# Patient Record
Sex: MALE | Race: BLACK OR AFRICAN AMERICAN | Employment: OTHER | URBAN - METROPOLITAN AREA
[De-identification: names, ages, dates, MRNs, and addresses within clinical notes are randomized per-mention and may not be internally consistent; named-entity substitution may affect disease eponyms.]

---

## 2021-09-09 DIAGNOSIS — J93.83 OTHER PNEUMOTHORAX: Primary | ICD-10-CM

## 2021-09-14 ENCOUNTER — TELEPHONE (OUTPATIENT)
Dept: CARDIOTHORACIC SURGERY | Age: 76
End: 2021-09-14

## 2021-09-14 NOTE — TELEPHONE ENCOUNTER
Unable to LVM to reschedule pts appointment for Thursday. Pts phone was continuously ringing. Pt also does not have any emergency contacts.

## 2021-09-21 ENCOUNTER — TELEPHONE (OUTPATIENT)
Dept: CARDIOTHORACIC SURGERY | Age: 76
End: 2021-09-21

## 2021-09-21 NOTE — TELEPHONE ENCOUNTER
Dr. Josefina Guzman calls from [facility] stating that pts upcoming appointment this Thursday needs to be rescheduled out 2 weeks d/t having an infection at drain site and being admitted. He states if there are any questions or concerns to have Dr. Sherine Felton call his cell phone.  [phone]

## 2021-10-06 ENCOUNTER — OFFICE VISIT (OUTPATIENT)
Dept: CARDIOTHORACIC SURGERY | Age: 76
End: 2021-10-06
Payer: COMMERCIAL

## 2021-10-06 VITALS
WEIGHT: 207 LBS | HEART RATE: 109 BPM | TEMPERATURE: 98.3 F | HEIGHT: 72 IN | OXYGEN SATURATION: 96 % | DIASTOLIC BLOOD PRESSURE: 84 MMHG | SYSTOLIC BLOOD PRESSURE: 136 MMHG | BODY MASS INDEX: 28.04 KG/M2 | RESPIRATION RATE: 17 BRPM

## 2021-10-06 DIAGNOSIS — J93.83 OTHER PNEUMOTHORAX: Primary | ICD-10-CM

## 2021-10-06 PROCEDURE — 99203 OFFICE O/P NEW LOW 30 MIN: CPT | Performed by: NURSE PRACTITIONER

## 2021-10-06 RX ORDER — MIRTAZAPINE 15 MG/1
7.5 TABLET, FILM COATED ORAL NIGHTLY
COMMUNITY
Start: 2021-07-06

## 2021-10-06 RX ORDER — LANOLIN ALCOHOL/MO/W.PET/CERES
2 CREAM (GRAM) TOPICAL 2 TIMES DAILY
COMMUNITY

## 2021-10-06 RX ORDER — DOXYCYCLINE HYCLATE 100 MG/1
CAPSULE ORAL
COMMUNITY
Start: 2021-09-21

## 2021-10-06 RX ORDER — LISINOPRIL AND HYDROCHLOROTHIAZIDE 25; 20 MG/1; MG/1
TABLET ORAL
COMMUNITY
Start: 2021-07-12

## 2021-10-06 RX ORDER — ALBUTEROL SULFATE 90 UG/1
2 AEROSOL, METERED RESPIRATORY (INHALATION) 4 TIMES DAILY PRN
COMMUNITY
Start: 2021-07-06

## 2021-10-06 RX ORDER — METOPROLOL SUCCINATE 50 MG/1
50 TABLET, EXTENDED RELEASE ORAL DAILY
COMMUNITY
Start: 2021-08-11

## 2021-10-06 RX ORDER — ASPIRIN 81 MG/1
81 TABLET, CHEWABLE ORAL DAILY
COMMUNITY

## 2021-10-06 RX ORDER — ATORVASTATIN CALCIUM 80 MG/1
80 TABLET, FILM COATED ORAL DAILY
COMMUNITY

## 2021-10-06 RX ORDER — ATORVASTATIN CALCIUM 80 MG/1
TABLET, FILM COATED ORAL
COMMUNITY
Start: 2021-08-27

## 2021-10-06 RX ORDER — LISINOPRIL AND HYDROCHLOROTHIAZIDE 25; 20 MG/1; MG/1
1 TABLET ORAL
COMMUNITY

## 2021-10-06 NOTE — PATIENT INSTRUCTIONS
Follow-up with Dr. Uriel Chase for further pulmonary evaluation  Follow-up with cardiothoracic surgery through [address] [place] [address]

## 2021-10-06 NOTE — PROGRESS NOTES
[facility] Cardiothoracic Surgery  Consult    Patient's Name/Date of Birth: Eamon Giemnez / 1945 (74 y.o.)    Date: October 6, 2021     Chief Complaint: Pneumothorax    HPI: Eamon Gimenez is a 64 y.o.  male who was referred to us from Dr. Uriel Chase of  regarding history of spontaneous pneumothorax. Patient denies any chest pain or shortness of breath that has been abnormal from his baseline emphysema exudate. Resting in bed he is comfortable and is able to perform his ADLs at home. Denies any signs of pneumonia. Denies any nausea vomiting fevers. ROS:   CONSTITUTIONAL: Alert and oriented x4  Respiratory: negative  Cardiovascular: negative  Gastrointestinal: negative  Genitourinary:negative  Hematologic/lymphatic: negative  Musculoskeletal:negative  Neurological: negative  Endocrine: negative  Psychiatric: negative  Past Medical History:   Diagnosis Date    Hyperlipidemia     Hypertension      No past surgical history on file. No Known Allergies  No family history on file. Social History     Socioeconomic History    Marital status:      Spouse name: Not on file    Number of children: Not on file    Years of education: Not on file    Highest education level: Not on file   Occupational History    Not on file   Tobacco Use    Smoking status: Never Smoker    Smokeless tobacco: Never Used   Substance and Sexual Activity    Alcohol use: No    Drug use: No    Sexual activity: Never   Other Topics Concern    Not on file   Social History Narrative    Not on file     Social Determinants of Health     Financial Resource Strain:     Difficulty of Paying Living Expenses:    Food Insecurity:     Worried About Running Out of Food in the Last Year:     [address] in the Last Year:    Transportation Needs:     Lack of Transportation (Medical):      Lack of Transportation (Non-Medical):    Physical Activity:     Days of Exercise per Week:     Minutes of Exercise per Session: Stress:     Feeling of Stress :    Social Connections:     Frequency of Communication with Friends and Family:     Frequency of Social Gatherings with Friends and Family:     Attends Hinduism Services:     Active Member of Clubs or Organizations:     Attends Club or Organization Meetings:     Marital Status:    Intimate Partner Violence:     Fear of Current or Ex-Partner:     Emotionally Abused:     Physically Abused:     Sexually Abused:        Current Outpatient Medications   Medication Sig Dispense Refill    albuterol sulfate  (90 Base) MCG/ACT inhaler Inhale 2 puffs into the lungs 4 times daily as needed      aspirin 81 MG chewable tablet Take 81 mg by mouth daily      atorvastatin (LIPITOR) 80 MG tablet Take 80 mg by mouth daily      atorvastatin (LIPITOR) 80 MG tablet       calcium citrate-vitamin D (CITRACAL+D) 315-200 MG-UNIT per tablet Take 2 tablets by mouth 2 times daily      doxycycline hyclate (VIBRAMYCIN) 100 MG capsule take 1 capsule by mouth twice a day for 10 days      lisinopril-hydroCHLOROthiazide (PRINZIDE;ZESTORETIC) 20-25 MG per tablet Take 1 tablet by mouth every morning (before breakfast)      lisinopril-hydroCHLOROthiazide (PRINZIDE;ZESTORETIC) 20-25 MG per tablet take 1 tablet by mouth once daily      metoprolol succinate (TOPROL XL) 50 MG extended release tablet Take 50 mg by mouth daily      mirtazapine (REMERON) 15 MG tablet Take 7.5 mg by mouth nightly      pravastatin (PRAVACHOL) 40 MG tablet Take 40 mg by mouth daily      cetirizine (ZYRTEC) 10 MG tablet Take 10 mg by mouth daily      amLODIPine (NORVASC) 5 MG tablet Take 5 mg by mouth daily      lisinopril (PRINIVIL;ZESTRIL) 20 MG tablet Take 20 mg by mouth daily      acetaminophen (TYLENOL) 325 MG tablet Take 2 tablets by mouth every 6 hours as needed for Pain 30 tablet 0     No current facility-administered medications for this visit.        Physical Exam:  Vitals:    10/06/21 1010   BP: 136/84 Pulse: 109   Resp: 17   Temp: 98.3 °F (36.8 °C)   SpO2: 96%     Weight: Weight: 207 lb (93.9 kg)    Weight: 207 lb (93.9 kg)        General: Alert and Oriented x3. Sitting up in bed. No apparent distress. HEENT:  Normocephalic and atraumatic. PERRL. EOMI. Lips and oral mucosa moist and without lesions. Neck:  Supple. Trachea midline. Chest:  No abnormality. Equal and symmetric expansion with respiration. Lungs:  Clear to auscultation diminished bilateral lower bases. On nasal cannula oxygen that he wears 24/7   cardiac:  Regular rate and rhythm without murmurs, rubs or gallops. Abdomen:  Soft, non-tender, normoactive bowel sounds. No masses or organomegaly. Extremities:  No cyanosis, clubbing, or edema. Intact pulses in all four extremities. Musculoskeletal:  Intact range of motion of peripheral joints. Normal muscular strength. Neurologic:  Cranial nerves are grossly intact. Non-focal sensory deficits on exam.  Psychiatric: Mood and affect are appropriate. Imaging Studies:    Cardiac Cath: N/A    Echo: N/A    CT: NA    Patient today received a x-ray done through [address] [place] [address]. The x-ray does not show any pneumothorax. No pleural effusions. No significant pulmonary edema    Assessment       Risks Reviewed w/pt  No plans for surgery    PLAN:  Patient will get outpatient x-ray today and we will review.   [facility] does no longer take his insurance therefore he will seek further care through cardiothoracic at [address] [place] [address]  Patient will follow up with Dr. Matthew Johnson regarding further pulmonary evaluation    Time spent in chart 10   Time spent with patient Kevinpageade Christie, APRN - NP, CNP  Phone: [phone]

## 2024-08-24 ENCOUNTER — APPOINTMENT (OUTPATIENT)
Dept: CT IMAGING | Age: 79
End: 2024-08-24
Payer: COMMERCIAL

## 2024-08-24 ENCOUNTER — HOSPITAL ENCOUNTER (OUTPATIENT)
Age: 79
Setting detail: OBSERVATION
Discharge: HOME | End: 2024-08-25
Attending: EMERGENCY MEDICINE | Admitting: EMERGENCY MEDICINE
Payer: COMMERCIAL

## 2024-08-24 ENCOUNTER — APPOINTMENT (OUTPATIENT)
Dept: GENERAL RADIOLOGY | Age: 79
End: 2024-08-24
Payer: COMMERCIAL

## 2024-08-24 DIAGNOSIS — N17.9 ACUTE KIDNEY INJURY (HCC): ICD-10-CM

## 2024-08-24 DIAGNOSIS — R55 SYNCOPE, UNSPECIFIED SYNCOPE TYPE: Primary | ICD-10-CM

## 2024-08-24 LAB
ALBUMIN SERPL-MCNC: 3.8 G/DL (ref 3.5–5.2)
ALBUMIN/GLOB SERPL: 1 {RATIO} (ref 1–2.5)
ALP SERPL-CCNC: 61 U/L (ref 40–129)
ALT SERPL-CCNC: 14 U/L (ref 10–50)
ANION GAP SERPL CALCULATED.3IONS-SCNC: 11 MMOL/L (ref 9–16)
AST SERPL-CCNC: 24 U/L (ref 10–50)
BASOPHILS # BLD: <0.03 K/UL (ref 0–0.2)
BASOPHILS NFR BLD: 0 % (ref 0–2)
BILIRUB SERPL-MCNC: 0.3 MG/DL (ref 0–1.2)
BNP SERPL-MCNC: <36 PG/ML (ref 0–300)
BUN SERPL-MCNC: 18 MG/DL (ref 8–23)
CALCIUM SERPL-MCNC: 9.3 MG/DL (ref 8.6–10.4)
CHLORIDE SERPL-SCNC: 102 MMOL/L (ref 98–107)
CO2 SERPL-SCNC: 25 MMOL/L (ref 20–31)
CREAT SERPL-MCNC: 1.3 MG/DL (ref 0.7–1.2)
D DIMER PPP FEU-MCNC: 2.22 UG/ML FEU (ref 0–0.57)
EOSINOPHIL # BLD: 0.04 K/UL (ref 0–0.44)
EOSINOPHILS RELATIVE PERCENT: 1 % (ref 1–4)
ERYTHROCYTE [DISTWIDTH] IN BLOOD BY AUTOMATED COUNT: 13.8 % (ref 11.8–14.4)
GFR, ESTIMATED: 57 ML/MIN/1.73M2
GLUCOSE SERPL-MCNC: 165 MG/DL (ref 74–99)
HCT VFR BLD AUTO: 45.7 % (ref 40.7–50.3)
HGB BLD-MCNC: 14.9 G/DL (ref 13–17)
IMM GRANULOCYTES # BLD AUTO: <0.03 K/UL (ref 0–0.3)
IMM GRANULOCYTES NFR BLD: 0 %
LYMPHOCYTES NFR BLD: 2.08 K/UL (ref 1.1–3.7)
LYMPHOCYTES RELATIVE PERCENT: 37 % (ref 24–43)
MCH RBC QN AUTO: 29.6 PG (ref 25.2–33.5)
MCHC RBC AUTO-ENTMCNC: 32.6 G/DL (ref 28.4–34.8)
MCV RBC AUTO: 90.7 FL (ref 82.6–102.9)
MONOCYTES NFR BLD: 0.58 K/UL (ref 0.1–1.2)
MONOCYTES NFR BLD: 10 % (ref 3–12)
NEUTROPHILS NFR BLD: 52 % (ref 36–65)
NEUTS SEG NFR BLD: 2.86 K/UL (ref 1.5–8.1)
NRBC BLD-RTO: 0 PER 100 WBC
PLATELET # BLD AUTO: 219 K/UL (ref 138–453)
PMV BLD AUTO: 10 FL (ref 8.1–13.5)
POTASSIUM SERPL-SCNC: 3.7 MMOL/L (ref 3.7–5.3)
PROT SERPL-MCNC: 6.4 G/DL (ref 6.6–8.7)
RBC # BLD AUTO: 5.04 M/UL (ref 4.21–5.77)
SODIUM SERPL-SCNC: 138 MMOL/L (ref 136–145)
TROPONIN I SERPL HS-MCNC: 14 NG/L (ref 0–22)
TROPONIN I SERPL HS-MCNC: 15 NG/L (ref 0–22)
TSH SERPL DL<=0.05 MIU/L-ACNC: 3.86 UIU/ML (ref 0.27–4.2)
WBC OTHER # BLD: 5.6 K/UL (ref 3.5–11.3)

## 2024-08-24 PROCEDURE — 83880 ASSAY OF NATRIURETIC PEPTIDE: CPT

## 2024-08-24 PROCEDURE — G0378 HOSPITAL OBSERVATION PER HR: HCPCS

## 2024-08-24 PROCEDURE — 6360000004 HC RX CONTRAST MEDICATION

## 2024-08-24 PROCEDURE — 99285 EMERGENCY DEPT VISIT HI MDM: CPT

## 2024-08-24 PROCEDURE — 84484 ASSAY OF TROPONIN QUANT: CPT

## 2024-08-24 PROCEDURE — 85025 COMPLETE CBC W/AUTO DIFF WBC: CPT

## 2024-08-24 PROCEDURE — 85379 FIBRIN DEGRADATION QUANT: CPT

## 2024-08-24 PROCEDURE — 71046 X-RAY EXAM CHEST 2 VIEWS: CPT

## 2024-08-24 PROCEDURE — 70498 CT ANGIOGRAPHY NECK: CPT

## 2024-08-24 PROCEDURE — 80053 COMPREHEN METABOLIC PANEL: CPT

## 2024-08-24 PROCEDURE — 84443 ASSAY THYROID STIM HORMONE: CPT

## 2024-08-24 PROCEDURE — 2580000003 HC RX 258

## 2024-08-24 PROCEDURE — 93005 ELECTROCARDIOGRAM TRACING: CPT | Performed by: EMERGENCY MEDICINE

## 2024-08-24 PROCEDURE — 70450 CT HEAD/BRAIN W/O DYE: CPT

## 2024-08-24 PROCEDURE — 71260 CT THORAX DX C+: CPT

## 2024-08-24 RX ORDER — ATORVASTATIN CALCIUM 80 MG/1
80 TABLET, FILM COATED ORAL DAILY
Status: DISCONTINUED | OUTPATIENT
Start: 2024-08-25 | End: 2024-08-25 | Stop reason: HOSPADM

## 2024-08-24 RX ORDER — ONDANSETRON 2 MG/ML
4 INJECTION INTRAMUSCULAR; INTRAVENOUS EVERY 6 HOURS PRN
Status: DISCONTINUED | OUTPATIENT
Start: 2024-08-24 | End: 2024-08-25 | Stop reason: HOSPADM

## 2024-08-24 RX ORDER — ONDANSETRON 4 MG/1
4 TABLET, ORALLY DISINTEGRATING ORAL EVERY 8 HOURS PRN
Status: DISCONTINUED | OUTPATIENT
Start: 2024-08-24 | End: 2024-08-25 | Stop reason: HOSPADM

## 2024-08-24 RX ORDER — ACETAMINOPHEN 325 MG/1
650 TABLET ORAL EVERY 4 HOURS PRN
Status: DISCONTINUED | OUTPATIENT
Start: 2024-08-24 | End: 2024-08-25 | Stop reason: HOSPADM

## 2024-08-24 RX ORDER — 0.9 % SODIUM CHLORIDE 0.9 %
1000 INTRAVENOUS SOLUTION INTRAVENOUS ONCE
Status: COMPLETED | OUTPATIENT
Start: 2024-08-24 | End: 2024-08-24

## 2024-08-24 RX ORDER — METOPROLOL SUCCINATE 25 MG/1
50 TABLET, EXTENDED RELEASE ORAL DAILY
Status: DISCONTINUED | OUTPATIENT
Start: 2024-08-25 | End: 2024-08-25 | Stop reason: HOSPADM

## 2024-08-24 RX ORDER — SODIUM CHLORIDE 0.9 % (FLUSH) 0.9 %
5-40 SYRINGE (ML) INJECTION EVERY 12 HOURS SCHEDULED
Status: DISCONTINUED | OUTPATIENT
Start: 2024-08-24 | End: 2024-08-25 | Stop reason: HOSPADM

## 2024-08-24 RX ORDER — AMLODIPINE BESYLATE 5 MG/1
5 TABLET ORAL DAILY
Status: DISCONTINUED | OUTPATIENT
Start: 2024-08-25 | End: 2024-08-25 | Stop reason: HOSPADM

## 2024-08-24 RX ORDER — SODIUM CHLORIDE 9 MG/ML
INJECTION, SOLUTION INTRAVENOUS PRN
Status: DISCONTINUED | OUTPATIENT
Start: 2024-08-24 | End: 2024-08-25 | Stop reason: HOSPADM

## 2024-08-24 RX ORDER — ASPIRIN 81 MG/1
81 TABLET, CHEWABLE ORAL DAILY
Status: DISCONTINUED | OUTPATIENT
Start: 2024-08-25 | End: 2024-08-25 | Stop reason: HOSPADM

## 2024-08-24 RX ORDER — SODIUM CHLORIDE 0.9 % (FLUSH) 0.9 %
5-40 SYRINGE (ML) INJECTION PRN
Status: DISCONTINUED | OUTPATIENT
Start: 2024-08-24 | End: 2024-08-25 | Stop reason: HOSPADM

## 2024-08-24 RX ORDER — IOPAMIDOL 755 MG/ML
150 INJECTION, SOLUTION INTRAVASCULAR
Status: COMPLETED | OUTPATIENT
Start: 2024-08-24 | End: 2024-08-24

## 2024-08-24 RX ADMIN — SODIUM CHLORIDE, PRESERVATIVE FREE 10 ML: 5 INJECTION INTRAVENOUS at 21:48

## 2024-08-24 RX ADMIN — SODIUM CHLORIDE 1000 ML: 9 INJECTION, SOLUTION INTRAVENOUS at 15:52

## 2024-08-24 RX ADMIN — IOPAMIDOL 150 ML: 755 INJECTION, SOLUTION INTRAVENOUS at 18:25

## 2024-08-24 ASSESSMENT — LIFESTYLE VARIABLES
HOW OFTEN DO YOU HAVE A DRINK CONTAINING ALCOHOL: NEVER
HOW MANY STANDARD DRINKS CONTAINING ALCOHOL DO YOU HAVE ON A TYPICAL DAY: PATIENT DOES NOT DRINK

## 2024-08-24 NOTE — ED PROVIDER NOTES
obtained.    Patient will need CT based on the elevated D-dimer this will also exclude and further exclude any aortic pathology patient also elderly with cardiac risk factors including hypertension hyperlipidemia and prior admissions for fluid on the lungs likely related to CHF patient will need cardiac evaluation if not having ACS      ED Course as of 08/24/24 2322   Sat Aug 24, 2024   1552 EKG Interpretation   Interpreted by Shayne Cook DO    Rhythm: normal sinus   Rate: LAD  Axis: LAD  Ectopy: none  Conduction: normal  ST Segments: normal  T Waves: normal  Q Waves: none    Clinical Impression: PRWP, LAD, and Low voltage [WK]   1623 Comprehensive Metabolic Panel(!):    Sodium 138   Potassium 3.7   Chloride 102   CARBON DIOXIDE 25   Anion Gap 11   Glucose 165(!)   BUN,BUNPL 18   Creatinine 1.3(!)   Est, Glom Filt Rate 57(!)   Calcium 9.3   Total Protein 6.4(!)   Albumin 3.8   Albumin/Globulin Ratio 1.0   Total Bilirubin 0.3   Alkaline Phosphatase 61   ALT 14   AST 24 [AS]   1623 D-Dimer, Quantitative(!):    D-Dimer, Quant 2.22(!) [AS]   1623 CBC with Auto Differential:    WBC 5.6   RBC 5.04   Hemoglobin Quant 14.9   Hematocrit 45.7   MCV 90.7   MCH 29.6   MCHC 32.6   RDW 13.8   Platelet Count 219   MPV 10.0   NRBC Automated 0.0   Neutrophils % 52   Lymphocyte % 37   Monocytes % 10   Eosinophils % 1   Basophils % 0   Immature Granulocytes % 0   Neutrophils Absolute 2.86   Lymphocytes Absolute 2.08   Monocytes Absolute 0.58   Eosinophils Absolute 0.04   Basophils Absolute <0.03   Immature Granulocytes Absolute <0.03 [AS]   1654 XR CHEST (2 VW) [AS]   1654 1. No cardiomegaly; venous hypertension without radiographic CHF.  2. No consolidation or sizable effusion.  Likely chronic changes, as above.   [AS]   1901 CT HEAD WO CONTRAST [AS]   1901 1. No acute intracranial abnormality.  2. No large vessel occlusion in the head or neck.   [AS]   1901 CTA HEAD NECK W CONTRAST [AS]   1919 1.  No acute pulmonary embolism.  2.

## 2024-08-25 VITALS
RESPIRATION RATE: 19 BRPM | WEIGHT: 200 LBS | BODY MASS INDEX: 27.12 KG/M2 | SYSTOLIC BLOOD PRESSURE: 120 MMHG | OXYGEN SATURATION: 96 % | HEART RATE: 77 BPM | DIASTOLIC BLOOD PRESSURE: 69 MMHG | TEMPERATURE: 97.2 F

## 2024-08-25 PROBLEM — R55 SYNCOPE: Status: RESOLVED | Noted: 2024-08-24 | Resolved: 2024-08-25

## 2024-08-25 LAB
ANION GAP SERPL CALCULATED.3IONS-SCNC: 9 MMOL/L (ref 9–16)
BUN SERPL-MCNC: 11 MG/DL (ref 8–23)
CALCIUM SERPL-MCNC: 9.4 MG/DL (ref 8.6–10.4)
CHLORIDE SERPL-SCNC: 103 MMOL/L (ref 98–107)
CO2 SERPL-SCNC: 27 MMOL/L (ref 20–31)
CREAT SERPL-MCNC: 1.2 MG/DL (ref 0.7–1.2)
GFR, ESTIMATED: 63 ML/MIN/1.73M2
GLUCOSE SERPL-MCNC: 102 MG/DL (ref 74–99)
POTASSIUM SERPL-SCNC: 3.9 MMOL/L (ref 3.7–5.3)
SODIUM SERPL-SCNC: 139 MMOL/L (ref 136–145)

## 2024-08-25 PROCEDURE — 80048 BASIC METABOLIC PNL TOTAL CA: CPT

## 2024-08-25 PROCEDURE — 96361 HYDRATE IV INFUSION ADD-ON: CPT

## 2024-08-25 PROCEDURE — 99222 1ST HOSP IP/OBS MODERATE 55: CPT | Performed by: INTERNAL MEDICINE

## 2024-08-25 PROCEDURE — 2580000003 HC RX 258: Performed by: EMERGENCY MEDICINE

## 2024-08-25 PROCEDURE — G0378 HOSPITAL OBSERVATION PER HR: HCPCS

## 2024-08-25 PROCEDURE — 96360 HYDRATION IV INFUSION INIT: CPT

## 2024-08-25 PROCEDURE — 36415 COLL VENOUS BLD VENIPUNCTURE: CPT

## 2024-08-25 PROCEDURE — 93005 ELECTROCARDIOGRAM TRACING: CPT

## 2024-08-25 PROCEDURE — 2580000003 HC RX 258

## 2024-08-25 RX ORDER — SODIUM CHLORIDE 9 MG/ML
INJECTION, SOLUTION INTRAVENOUS CONTINUOUS
Status: DISCONTINUED | OUTPATIENT
Start: 2024-08-25 | End: 2024-08-25 | Stop reason: HOSPADM

## 2024-08-25 RX ADMIN — SODIUM CHLORIDE: 9 INJECTION, SOLUTION INTRAVENOUS at 11:30

## 2024-08-25 RX ADMIN — SODIUM CHLORIDE, PRESERVATIVE FREE 10 ML: 5 INJECTION INTRAVENOUS at 09:58

## 2024-08-25 NOTE — DISCHARGE INSTRUCTIONS
Call today or tomorrow to follow up with @PCP@  in 3 days.    Get up slowly; dangle your feet over the bed before standing up, do not stand up quickly.    Return to the Emergency Department for blacking out, any headache, slurring of speech, loss of strength, excessive nausea or vomiting, any other care or concern.

## 2024-08-25 NOTE — PROGRESS NOTES
[facility]  CDU / OBSERVATION ENCOUNTER  ATTENDING NOTE         I performed a history and physical examination of the patient and discussed management with the resident or midlevel provider. I reviewed the resident or midlevel provider's note and agree with the documented findings and plan of care. Any areas of disagreement are noted on the chart. I was personally present for the key portions of any procedures. I have documented in the chart those procedures where I was not present during the key portions. I have reviewed the nurses notes. I agree with the chief complaint, past medical history, past surgical history, allergies, medications, social and family history as documented unless otherwise noted below.    The Family history, social history, and ROS are effectively unchanged since admission unless noted elsewhere in the chart.     This patient was placed in the observation unit for reevaluation for possible admission to the hospital     Patient was anxious to leave but was held for recheck on creatinine.  Patient's kidney function had suffered slightly and was improving after IV hydration.  Patient with kidney function back to normal range.  Patient having p.o. now.  Patient had been in a situation consistent with dehydration and lightheadedness.    Patient and family had discussion regarding ongoing care.  Patient for outpatient echocardiogram and follow-up with primary care physician.    Patient has primary doctor.  Patient had echocardiogram written for outpatient follow-up.  Patient in good condition and understanding of discharge instructions.     Paco Hart MD  Attending Emergency  Physician

## 2024-08-25 NOTE — ED PROVIDER NOTES
[facility] OBSERVATION UNIT  Emergency Department Encounter  Emergency Medicine Resident     Pt Name:Nicolas Frias  MRN: [id number]  Birthdate 1945  Date of evaluation: 8/24/24  PCP:  Nabil Berkowitz MD  Note Started: 11:43 PM EDT      CHIEF COMPLAINT       Chief Complaint   Patient presents with    Dizziness     Pt to ED via EMS for dizziness. Pt states that he was sitting on a bench at the [facility], reached, back to grab something felt dizzy. Pt states that the symptoms last for 10-15 and have since subsided. Pt denies any chest pain or SOB. Pt wears 2L via nasal canula for COPD       HISTORY OF PRESENT ILLNESS  (Location/Symptom, Timing/Onset, Context/Setting, Quality, Duration, Modifying Factors, Severity.)      Nicolas Frias is a 79 y.o. male who presents with an episode of dizziness earlier today. He was sitting on a bench at the [facility] when he reached back quickly with his arm and suddenly felt very dizzy.  Patient denies LOC, blood thinner use.  The episode did not involve chest pain, shortness of breath, or a sensation of the room spinning. He described the dizziness as feeling like he had no energy and almost passed out. The episode lasted approximately 10 minutes. The patient denies any previous episodes of dizziness. The patient mentioned that he did not sleep well last night, attributing it to consuming a caffeinated beverage. He checked his blood pressure this morning and noted it was lower than usual. He also mentioned that he sometimes turns down his oxygen when sitting still, [facility].  Denies any shortness of breath, chest pain, headaches or difficulties with bowel/bladder function        PAST MEDICAL / SURGICAL / SOCIAL / FAMILY HISTORY      has a past medical history of Hyperlipidemia and Hypertension.       has no past surgical history on file.      Social History     Socioeconomic History    Marital status:      Spouse name: Not on file    Number of children: Not on file  denies any prodromal episode.  Patient is on 3 L oxygen at baseline due to history of COPD.  Differential diagnosis cardiogenic syncope, neurogenic syncope, dehydration, orthostatic hypotension, BPPV, TIA, PE.  Will obtain CBC, CMP, chest x-ray, troponin, EKG, CT head, CTA head and neck, D-dimer.  Will perform bedside ultrasound to evaluate heart functionality.    EKG    All EKG's are interpreted by the Emergency Department Physician who either signs or Co-signs this chart in the absence of a cardiologist.    EMERGENCY DEPARTMENT COURSE:      ED Course as of 08/24/24 2348   Sat Aug 24, 2024   1552 EKG Interpretation   Interpreted by Shayne Cook DO    Rhythm: normal sinus   Rate: LAD  Axis: LAD  Ectopy: none  Conduction: normal  ST Segments: normal  T Waves: normal  Q Waves: none    Clinical Impression: PRWP, LAD, and Low voltage [WK]   1623 Comprehensive Metabolic Panel(!):    Sodium 138   Potassium 3.7   Chloride 102   CARBON DIOXIDE 25   Anion Gap 11   Glucose 165(!)   BUN,BUNPL 18   Creatinine 1.3(!)   Est, Glom Filt Rate 57(!)   Calcium 9.3   Total Protein 6.4(!)   Albumin 3.8   Albumin/Globulin Ratio 1.0   Total Bilirubin 0.3   Alkaline Phosphatase 61   ALT 14   AST 24 [AS]   1623 D-Dimer, Quantitative(!):    D-Dimer, Quant 2.22(!) [AS]   1623 CBC with Auto Differential:    WBC 5.6   RBC 5.04   Hemoglobin Quant 14.9   Hematocrit 45.7   MCV 90.7   MCH 29.6   MCHC 32.6   RDW 13.8   Platelet Count 219   MPV 10.0   NRBC Automated 0.0   Neutrophils % 52   Lymphocyte % 37   Monocytes % 10   Eosinophils % 1   Basophils % 0   Immature Granulocytes % 0   Neutrophils Absolute 2.86   Lymphocytes Absolute 2.08   Monocytes Absolute 0.58   Eosinophils Absolute 0.04   Basophils Absolute <0.03   Immature Granulocytes Absolute <0.03 [AS]   1654 XR CHEST (2 VW) [AS]   1654 1. No cardiomegaly; venous hypertension without radiographic CHF.  2. No consolidation or sizable effusion.  Likely chronic changes, as above.   [AS]   1901

## 2024-08-25 NOTE — CONSULTS
[facility]    Consult               Today's Date: 8/25/2024  Patient Name: Nicolas Frias  Date of admission: 8/24/2024  3:13 PM  Patient's age: 79 y.o., 1945  Admission Dx: Syncope and collapse [R55]    Requesting Physician: Paco Hart MD    Cardiac Evaluation Reason:  syncope    History Obtained From: patient and chart review     History of Present Illness:    This patient 79 y.o. years old with past medical history given below.     Per ED, Nicolas Frias is a 79 y.o. male who presents with an episode of dizziness 8/24. He was sitting on a bench at the [facility] when he reached back quickly with his arm and suddenly felt very dizzy.  Patient denies LOC, blood thinner use.  The episode did not involve chest pain, shortness of breath, or a sensation of the room spinning. He described the dizziness as feeling like he had no energy and almost passed out. The episode lasted approximately 10 minutes. The patient denies any previous episodes of dizziness. The patient mentioned that he did not sleep well last night, attributing it to consuming a caffeinated beverage. He checked his blood pressure this morning and noted it was lower than usual. He also mentioned that he sometimes turns down his oxygen when sitting still, [facility].  Denies any shortness of breath, chest pain, headaches or difficulties with bowel/bladder function.    Per patient, he reports that yesterday he was at the [facility] and was getting up from the bleachers, pushing up from a shoulder.  He reports a history of shoulder injury.  Reports when he was pushing up from the shoulder he had a sharp left-sided pain in shoulder and felt slightly unsteady when standing up.  Felt a little bit dizzy.  No loss of consciousness did not hit head.  Did not fall or pass out. Resolved in less than a minute.    Reports no shoulder pain at the moment.  No chest pain, no heart palpitations.    No history of heart attack.  Reported was

## 2024-08-25 NOTE — ED NOTES
ED to inpatient nurses report      Chief Complaint:  Chief Complaint   Patient presents with    Dizziness     Pt to ED via EMS for dizziness. Pt states that he was sitting on a bench at the [facility], reached, back to grab something felt dizzy. Pt states that the symptoms last for 10-15 and have since subsided. Pt denies any chest pain or SOB. Pt wears 2L via nasal canula for COPD     Present to ED from: home    MOA:     LOC: alert and orientated to name, place, date  Mobility: Independent  Oxygen Baseline: 2L via nasal canula hx COPD    Current needs required: supplemental o2   Pending ED orders: none  Present condition: none    Why did the patient come to the ED? See CC  What is the plan? Cardiology consult  Any procedures or intervention occur? None   Any safety concerns??    Mental Status:       Psych Assessment:      Vital signs   Vitals:    08/24/24 1519 08/24/24 1524 08/24/24 1525 08/24/24 1816   BP: 118/63  122/84 119/71   Pulse: 81  82 100   Resp: 23  28 29   Temp:  97.6 °F (36.4 °C)     SpO2: 94%  93% 91%   Weight:   90.7 kg (200 lb)         Vitals:  Patient Vitals for the past 24 hrs:   BP Temp Pulse Resp SpO2 Weight   08/24/24 1816 119/71 -- 100 29 91 % --   08/24/24 1525 122/84 -- 82 28 93 % 90.7 kg (200 lb)   08/24/24 1524 -- 97.6 °F (36.4 °C) -- -- -- --   08/24/24 1519 118/63 -- 81 23 94 % --   08/24/24 1514 114/68 -- 82 20 94 % --      Visit Vitals  /71   Pulse 100   Temp 97.6 °F (36.4 °C)   Resp 29   Wt 90.7 kg (200 lb)   SpO2 91%   BMI 27.12 kg/m²        LDAs:   Peripheral IV 08/24/24 Right Forearm (Active)   Site Assessment Clean, dry & intact 08/24/24 1514   Line Status Normal saline locked 08/24/24 1514   Phlebitis Assessment No symptoms 08/24/24 1514   Infiltration Assessment 0 08/24/24 1514       Ambulatory Status:  Presents to emergency department  because of falls (Syncope, seizure, or loss of consciousness): No, Age > 70: Yes, Altered Mental Status, Intoxication with alcohol or substance  0.9 % sodium chloride infusion    acetaminophen (TYLENOL) tablet 650 mg    OR Linked Order Group     ondansetron (ZOFRAN-ODT) disintegrating tablet 4 mg     ondansetron (ZOFRAN) injection 4 mg       SURGICAL HISTORY     History reviewed. No pertinent surgical history.    PAST MEDICAL HISTORY       Past Medical History:   Diagnosis Date    Hyperlipidemia     Hypertension        Labs:  Labs Reviewed   COMPREHENSIVE METABOLIC PANEL - Abnormal; Notable for the following components:       Result Value    Glucose 165 (*)     Creatinine 1.3 (*)     Est, Glom Filt Rate 57 (*)     Total Protein 6.4 (*)     All other components within normal limits   D-DIMER, QUANTITATIVE - Abnormal; Notable for the following components:    D-Dimer, Quant 2.22 (*)     All other components within normal limits   CBC WITH AUTO DIFFERENTIAL   TROPONIN   TROPONIN   BRAIN NATRIURETIC PEPTIDE   TSH WITH REFLEX       Electronically signed by Gustavo Woods RN on 8/24/2024 at 8:31 PM

## 2024-08-25 NOTE — PROGRESS NOTES
Discharge teaching and instructions completed with patient using teachback method. AVS reviewed. Printed prescriptions given to patient. Patient voiced understanding regarding prescriptions, follow up appointments, and care of self at home. Discharged home with son. All questions answered.

## 2024-08-25 NOTE — H&P
intravenous contrast. Automated exposure control, iterative reconstruction, and/or weight based adjustment of the mA/kV was utilized to reduce the radiation dose to as low as reasonably achievable. Noncontrast CT of the head with reconstructed 2-D images are also provided for review. COMPARISON: None. HISTORY: ORDERING SYSTEM PROVIDED HISTORY: sudden syncopal episode. rule out vertebral artery stenosis/dissection. TECHNOLOGIST PROVIDED HISTORY: Sudden syncopal episode. rule out vertebral artery stenosis/dissection. Decision Support Exception - unselect if not a suspected or confirmed emergency medical condition->Emergency Medical Condition (MA); ORDERING SYSTEM PROVIDED HISTORY: syncope. rule out bleed TECHNOLOGIST PROVIDED HISTORY: Syncope. rule out bleed Decision Support Exception - unselect if not a suspected or confirmed emergency medical condition->Emergency Medical Condition (MA) FINDINGS: CT HEAD: BRAIN/VENTRICLES:  No acute intracranial hemorrhage or extraaxial fluid collection. Grey-white differentiation is maintained.  No evidence of mass, mass effect or midline shift.  No evidence of hydrocephalus. ORBITS: The visualized portion of the orbits demonstrate no acute abnormality. SINUSES:  The visualized paranasal sinuses and mastoid air cells demonstrate no acute abnormality. SOFT TISSUES/SKULL: No acute abnormality of the visualized skull or soft tissues. CTA NECK: AORTIC ARCH/ARCH VESSELS: No dissection or arterial injury.  No significant stenosis of the brachiocephalic or subclavian arteries. CAROTID ARTERIES: Noncalcified plaque at the carotid bifurcations without significant stenosis. The common and internal carotid arteries are patent. VERTEBRAL ARTERIES: No dissection, arterial injury, or significant stenosis. SOFT TISSUES: Moderate upper lung emphysema.  No neck mass. BONES: No acute osseous abnormality. CTA HEAD: ANTERIOR CIRCULATION: No significant stenosis of the intracranial internal carotid,  no Kerley lines, consolidation or sizable effusion.  Calcified granuloma right lung base. Moderate kyphoscoliosis thoracic spine with extensive multilevel DJD and DDD findings and ossification anterior longitudinal ligament.     1. No cardiomegaly; venous hypertension without radiographic CHF. 2. No consolidation or sizable effusion.  Likely chronic changes, as above.       LABS:  I have reviewed and interpreted all available lab results.  Labs Reviewed   COMPREHENSIVE METABOLIC PANEL - Abnormal; Notable for the following components:       Result Value    Glucose 165 (*)     Creatinine 1.3 (*)     Est, Glom Filt Rate 57 (*)     Total Protein 6.4 (*)     All other components within normal limits   D-DIMER, QUANTITATIVE - Abnormal; Notable for the following components:    D-Dimer, Quant 2.22 (*)     All other components within normal limits   CBC WITH AUTO DIFFERENTIAL   TROPONIN   TROPONIN   BRAIN NATRIURETIC PEPTIDE   TSH WITH REFLEX         CDU IMPRESSION / PLAN      Nicolas Frias is a 79 y.o. male who presents with with acute onset of dizziness and syncope after exercising.  Patient was evaluated in the ED had a negative workup however was admitted to the observation unit for further evaluation    Potassium 3.7, creatinine 1.3 bun 18, Troponin 15, 14, Hgb 14.9    Subjective assessment today: No new concerns today.  Patient denies any chest pain or dizziness currently.    Clinical impression: Dizziness/syncope for evaluation  Elevated creatinine likely secondary to dehydration  Known hyperlipidemia, hypertension    Cardiology consultation   Probably vasovagal. will check orthostatics.  IV hydration.  No further cardiac workup is needed will follow as an outpatient.   Patient reviewed with attending  Repeat BMP today, Repeat Cr 1.2, will repeat as outpatient in 1 day and echo as outpatient with cardiology follow up  Will follow the above recommendations  Once creatinine is within normal limits and patient's

## 2024-08-25 NOTE — DISCHARGE SUMMARY
CDU Discharge Summary        Patient:  Nicolas Frias  YOB: 1945    MRN: [id number]   Acct: [id number]    Primary Care Physician: Nabil Berkowitz MD    Admit date:  8/24/2024  3:13 PM  Discharge date: 8/25/2024 2:03 PM    Discharge Diagnoses:     1.)  Patient had dizziness with acute onset due to likely dehydration, not due to cardiac ischemia.  Treated with IV hydration, diagnostic evaluation, therapeutic management and cardiology consultation.  Patient's symptoms are improved with the plan to discharge    Follow-up:  Call today/tomorrow for a follow up appointment with Nabil Berkowitz MD , or return to the Emergency Room with worsening symptoms    Stressed to patient the importance of following up with primary care doctor for further workup/management of symptoms.  Pt verbalizes understanding and agrees with plan.    Discharge Medication Changes:       Medication List        CONTINUE taking these medications      acetaminophen 325 MG tablet  Commonly known as: TYLENOL  Take 2 tablets by mouth every 6 hours as needed for Pain     albuterol sulfate  (90 Base) MCG/ACT inhaler  Commonly known as: PROVENTIL;VENTOLIN;PROAIR     amLODIPine 5 MG tablet  Commonly known as: NORVASC     aspirin 81 MG chewable tablet     atorvastatin 80 MG tablet  Commonly known as: LIPITOR     calcium citrate-vitamin D 315-200 MG-UNIT per tablet  Commonly known as: CITRACAL+D     cetirizine 10 MG tablet  Commonly known as: ZYRTEC     doxycycline hyclate 100 MG capsule  Commonly known as: VIBRAMYCIN     lisinopril 20 MG tablet  Commonly known as: PRINIVIL;ZESTRIL     * lisinopril-hydroCHLOROthiazide 20-25 MG per tablet  Commonly known as: PRINZIDE;ZESTORETIC     * lisinopril-hydroCHLOROthiazide 20-25 MG per tablet  Commonly known as: PRINZIDE;ZESTORETIC     metoprolol succinate 50 MG extended release tablet  Commonly known as: TOPROL XL     mirtazapine 15 MG tablet  Commonly known as: REMERON     pravastatin 40  embolism.  Main pulmonary artery is normal in caliber, 26 mm. Mediastinum: No axillary lymphadenopathy.  Enlarged right hilar lymph node is 26 mm short axis, enlarged left hilar lymph node is 20 mm short axis, precarinal lymph node is 20 mm short axis.  The heart and pericardium demonstrate no acute abnormality.  There is no acute abnormality of the thoracic aorta.  The ascending thoracic aorta is 33 mm, descending thoracic aorta 24 mm. Lungs/pleura: Extensive sequela of smoking including severe emphysema.  A 10 x 30 mm pelvic lobular soft tissue nodule is seen along the minor fissure medial right upper lobe (axial image 61).  Indeterminate, well-circumscribed 6 mm mean diameter soft tissue pulmonary nodule right middle lobe (axial series 4, image 62).  Similar appearing 5 mm subpleural soft tissue pulmonary nodule anterior right upper lobe (axial image 58).  Another soft tissue pulmonary nodule 6 mm is seen medial basal segment left lower lobe axial image 87.  No pulmonary consolidation or infiltrate evident.  No pleural effusion or pneumothorax. Upper Abdomen: Limited images of the upper abdomen are unremarkable. Soft Tissues/Bones: No acute bone or soft tissue abnormality. 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     1.  No acute pulmonary embolism. 2. No acute pulmonary infiltrate. 3.  Pulmonary sequela typical of that seen with smoking, including COPD. 4. Multifocal indeterminate pulmonary nodules, largest 10 x 30 mm medial right lower lobe. Per Fleischner Society Guidelines, recommend a PET/CT or tissue sampling.* 5. Indeterminate bilateral hilar and mild mediastinal lymph node enlargement concerning for neoplastic involvement though this may be reactive. ______________________________________________________________________________ ____ * These guidelines do not apply to immunocompromised patients and patients with

## 2024-08-26 LAB
EKG ATRIAL RATE: 76 BPM
EKG ATRIAL RATE: 81 BPM
EKG P AXIS: 53 DEGREES
EKG P AXIS: 68 DEGREES
EKG P-R INTERVAL: 176 MS
EKG P-R INTERVAL: 188 MS
EKG Q-T INTERVAL: 376 MS
EKG Q-T INTERVAL: 392 MS
EKG QRS DURATION: 82 MS
EKG QRS DURATION: 82 MS
EKG QTC CALCULATION (BAZETT): 436 MS
EKG QTC CALCULATION (BAZETT): 441 MS
EKG R AXIS: -33 DEGREES
EKG R AXIS: 7 DEGREES
EKG T AXIS: 24 DEGREES
EKG T AXIS: 52 DEGREES
EKG VENTRICULAR RATE: 76 BPM
EKG VENTRICULAR RATE: 81 BPM

## 2024-09-18 ENCOUNTER — HOSPITAL ENCOUNTER (OUTPATIENT)
Age: 79
Discharge: HOME | End: 2024-09-20
Payer: COMMERCIAL

## 2024-09-18 DIAGNOSIS — R55 SYNCOPE, UNSPECIFIED SYNCOPE TYPE: ICD-10-CM

## 2024-09-18 LAB
ECHO AO ASC DIAM: 3.4 CM
ECHO AO ROOT DIAM: 3.7 CM
ECHO AV AREA PEAK VELOCITY: 2.9 CM2
ECHO AV AREA VTI: 2.7 CM2
ECHO AV MEAN GRADIENT: 2 MMHG
ECHO AV MEAN VELOCITY: 0.7 M/S
ECHO AV PEAK GRADIENT: 4 MMHG
ECHO AV PEAK VELOCITY: 1 M/S
ECHO AV VELOCITY RATIO: 0.8
ECHO AV VTI: 19.6 CM
ECHO EST RA PRESSURE: 3 MMHG
ECHO IVC PROX: 1.2 CM
ECHO LA AREA 2C: 9.4 CM2
ECHO LA AREA 4C: 13.9 CM2
ECHO LA DIAMETER: 3 CM
ECHO LA MAJOR AXIS: 5 CM
ECHO LA MINOR AXIS: 3.1 CM
ECHO LA TO AORTIC ROOT RATIO: 0.81
ECHO LA VOL MOD A2C: 24 ML (ref 18–58)
ECHO LA VOL MOD A4C: 31 ML (ref 18–58)
ECHO LV E' LATERAL VELOCITY: 6 CM/S
ECHO LV E' SEPTAL VELOCITY: 7 CM/S
ECHO LV EDV A2C: 60 ML
ECHO LV EDV A4C: 67 ML
ECHO LV EJECTION FRACTION A2C: 61 %
ECHO LV EJECTION FRACTION A4C: 48 %
ECHO LV EJECTION FRACTION BIPLANE: 55 % (ref 55–100)
ECHO LV ESV A2C: 24 ML
ECHO LV ESV A4C: 34 ML
ECHO LV FRACTIONAL SHORTENING: 17 % (ref 28–44)
ECHO LV INTERNAL DIMENSION DIASTOLIC: 4.1 CM (ref 4.2–5.9)
ECHO LV INTERNAL DIMENSION SYSTOLIC: 3.4 CM
ECHO LV IVSD: 1 CM (ref 0.6–1)
ECHO LV MASS 2D: 132.1 G (ref 88–224)
ECHO LV POSTERIOR WALL DIASTOLIC: 1 CM (ref 0.6–1)
ECHO LV RELATIVE WALL THICKNESS RATIO: 0.49
ECHO LVOT AREA: 3.5 CM2
ECHO LVOT AV VTI INDEX: 0.79
ECHO LVOT DIAM: 2.1 CM
ECHO LVOT MEAN GRADIENT: 1 MMHG
ECHO LVOT PEAK GRADIENT: 3 MMHG
ECHO LVOT PEAK VELOCITY: 0.8 M/S
ECHO LVOT SV: 53.3 ML
ECHO LVOT VTI: 15.4 CM
ECHO MV A VELOCITY: 0.62 M/S
ECHO MV AREA VTI: 2.9 CM2
ECHO MV E DECELERATION TIME (DT): 127 MS
ECHO MV E VELOCITY: 0.45 M/S
ECHO MV E/A RATIO: 0.73
ECHO MV E/E' LATERAL: 7.5
ECHO MV E/E' RATIO (AVERAGED): 6.96
ECHO MV E/E' SEPTAL: 6.43
ECHO MV LVOT VTI INDEX: 1.21
ECHO MV MAX VELOCITY: 0.8 M/S
ECHO MV MEAN GRADIENT: 1 MMHG
ECHO MV MEAN VELOCITY: 0.5 M/S
ECHO MV PEAK GRADIENT: 3 MMHG
ECHO MV VTI: 18.7 CM
ECHO RIGHT VENTRICULAR SYSTOLIC PRESSURE (RVSP): 15 MMHG
ECHO RV FREE WALL PEAK S': 14 CM/S
ECHO RV TAPSE: 2.8 CM (ref 1.7–?)
ECHO TV REGURGITANT MAX VELOCITY: 1.71 M/S
ECHO TV REGURGITANT PEAK GRADIENT: 12 MMHG

## 2024-09-18 PROCEDURE — 93306 TTE W/DOPPLER COMPLETE: CPT | Performed by: INTERNAL MEDICINE

## 2024-09-18 PROCEDURE — 93306 TTE W/DOPPLER COMPLETE: CPT
